# Patient Record
Sex: FEMALE | Race: BLACK OR AFRICAN AMERICAN | NOT HISPANIC OR LATINO | Employment: OTHER | URBAN - METROPOLITAN AREA
[De-identification: names, ages, dates, MRNs, and addresses within clinical notes are randomized per-mention and may not be internally consistent; named-entity substitution may affect disease eponyms.]

---

## 2022-03-04 ENCOUNTER — HOSPITAL ENCOUNTER (EMERGENCY)
Facility: HOSPITAL | Age: 67
Discharge: HOME | End: 2022-03-04
Attending: EMERGENCY MEDICINE
Payer: MEDICAID

## 2022-03-04 VITALS
HEART RATE: 62 BPM | DIASTOLIC BLOOD PRESSURE: 103 MMHG | WEIGHT: 159 LBS | BODY MASS INDEX: 28.17 KG/M2 | OXYGEN SATURATION: 98 % | TEMPERATURE: 99 F | HEIGHT: 63 IN | RESPIRATION RATE: 18 BRPM | SYSTOLIC BLOOD PRESSURE: 233 MMHG

## 2022-03-04 DIAGNOSIS — I10 HYPERTENSION: ICD-10-CM

## 2022-03-04 DIAGNOSIS — R21 RASH: ICD-10-CM

## 2022-03-04 DIAGNOSIS — I10 HYPERTENSION, UNSPECIFIED TYPE: ICD-10-CM

## 2022-03-04 DIAGNOSIS — R21 SKIN RASH: Primary | ICD-10-CM

## 2022-03-04 LAB
ANION GAP SERPL CALC-SCNC: 12 MMOL/L (ref 8–16)
BASOPHILS # BLD AUTO: 0.03 K/UL (ref 0–0.2)
BASOPHILS NFR BLD: 0.5 % (ref 0–1.9)
BUN SERPL-MCNC: 10 MG/DL (ref 8–23)
BUN SERPL-MCNC: 11 MG/DL (ref 6–30)
CALCIUM SERPL-MCNC: 9 MG/DL (ref 8.7–10.5)
CHLORIDE SERPL-SCNC: 101 MMOL/L (ref 95–110)
CHLORIDE SERPL-SCNC: 102 MMOL/L (ref 95–110)
CO2 SERPL-SCNC: 25 MMOL/L (ref 23–29)
CREAT SERPL-MCNC: 0.8 MG/DL (ref 0.5–1.4)
CREAT SERPL-MCNC: 0.8 MG/DL (ref 0.5–1.4)
DIFFERENTIAL METHOD: ABNORMAL
EOSINOPHIL # BLD AUTO: 0.1 K/UL (ref 0–0.5)
EOSINOPHIL NFR BLD: 2.2 % (ref 0–8)
ERYTHROCYTE [DISTWIDTH] IN BLOOD BY AUTOMATED COUNT: 13.9 % (ref 11.5–14.5)
EST. GFR  (AFRICAN AMERICAN): >60 ML/MIN/1.73 M^2
EST. GFR  (NON AFRICAN AMERICAN): >60 ML/MIN/1.73 M^2
GLUCOSE SERPL-MCNC: 270 MG/DL (ref 70–110)
GLUCOSE SERPL-MCNC: 278 MG/DL (ref 70–110)
HCT VFR BLD AUTO: 35.4 % (ref 37–48.5)
HCT VFR BLD CALC: 35 %PCV (ref 36–54)
HGB BLD-MCNC: 11.2 G/DL (ref 12–16)
IMM GRANULOCYTES # BLD AUTO: 0.01 K/UL (ref 0–0.04)
IMM GRANULOCYTES NFR BLD AUTO: 0.2 % (ref 0–0.5)
LYMPHOCYTES # BLD AUTO: 2.7 K/UL (ref 1–4.8)
LYMPHOCYTES NFR BLD: 42.8 % (ref 18–48)
MCH RBC QN AUTO: 27.5 PG (ref 27–31)
MCHC RBC AUTO-ENTMCNC: 31.6 G/DL (ref 32–36)
MCV RBC AUTO: 87 FL (ref 82–98)
MONOCYTES # BLD AUTO: 0.6 K/UL (ref 0.3–1)
MONOCYTES NFR BLD: 9.8 % (ref 4–15)
NEUTROPHILS # BLD AUTO: 2.8 K/UL (ref 1.8–7.7)
NEUTROPHILS NFR BLD: 44.5 % (ref 38–73)
NRBC BLD-RTO: 0 /100 WBC
PLATELET # BLD AUTO: 347 K/UL (ref 150–450)
PMV BLD AUTO: 11.1 FL (ref 9.2–12.9)
POC IONIZED CALCIUM: 1.21 MMOL/L (ref 1.06–1.42)
POC TCO2 (MEASURED): 28 MMOL/L (ref 23–29)
POTASSIUM BLD-SCNC: 3.1 MMOL/L (ref 3.5–5.1)
POTASSIUM SERPL-SCNC: 3.1 MMOL/L (ref 3.5–5.1)
RBC # BLD AUTO: 4.08 M/UL (ref 4–5.4)
SAMPLE: ABNORMAL
SODIUM BLD-SCNC: 140 MMOL/L (ref 136–145)
SODIUM SERPL-SCNC: 139 MMOL/L (ref 136–145)
WBC # BLD AUTO: 6.24 K/UL (ref 3.9–12.7)

## 2022-03-04 PROCEDURE — 99284 EMERGENCY DEPT VISIT MOD MDM: CPT | Mod: ,,, | Performed by: EMERGENCY MEDICINE

## 2022-03-04 PROCEDURE — 80047 BASIC METABLC PNL IONIZED CA: CPT

## 2022-03-04 PROCEDURE — 80048 BASIC METABOLIC PNL TOTAL CA: CPT | Performed by: EMERGENCY MEDICINE

## 2022-03-04 PROCEDURE — 25000003 PHARM REV CODE 250: Performed by: EMERGENCY MEDICINE

## 2022-03-04 PROCEDURE — 85025 COMPLETE CBC W/AUTO DIFF WBC: CPT | Performed by: EMERGENCY MEDICINE

## 2022-03-04 PROCEDURE — 63600175 PHARM REV CODE 636 W HCPCS: Performed by: EMERGENCY MEDICINE

## 2022-03-04 PROCEDURE — 99284 EMERGENCY DEPT VISIT MOD MDM: CPT | Mod: 25

## 2022-03-04 PROCEDURE — 99284 PR EMERGENCY DEPT VISIT,LEVEL IV: ICD-10-PCS | Mod: ,,, | Performed by: EMERGENCY MEDICINE

## 2022-03-04 PROCEDURE — 93010 ELECTROCARDIOGRAM REPORT: CPT | Mod: ,,, | Performed by: INTERNAL MEDICINE

## 2022-03-04 PROCEDURE — 93005 ELECTROCARDIOGRAM TRACING: CPT

## 2022-03-04 PROCEDURE — 86803 HEPATITIS C AB TEST: CPT | Performed by: EMERGENCY MEDICINE

## 2022-03-04 PROCEDURE — 93010 EKG 12-LEAD: ICD-10-PCS | Mod: ,,, | Performed by: INTERNAL MEDICINE

## 2022-03-04 RX ORDER — ATORVASTATIN CALCIUM 10 MG/1
10 TABLET, FILM COATED ORAL DAILY
Qty: 30 TABLET | Refills: 0 | Status: SHIPPED | OUTPATIENT
Start: 2022-03-04 | End: 2022-04-03

## 2022-03-04 RX ORDER — DIPHENHYDRAMINE HCL 25 MG
25 CAPSULE ORAL
Status: COMPLETED | OUTPATIENT
Start: 2022-03-04 | End: 2022-03-04

## 2022-03-04 RX ORDER — PREDNISONE 20 MG/1
40 TABLET ORAL
Status: COMPLETED | OUTPATIENT
Start: 2022-03-04 | End: 2022-03-04

## 2022-03-04 RX ORDER — HYDROCHLOROTHIAZIDE 25 MG/1
25 TABLET ORAL
Status: COMPLETED | OUTPATIENT
Start: 2022-03-04 | End: 2022-03-04

## 2022-03-04 RX ORDER — AMLODIPINE BESYLATE 10 MG/1
10 TABLET ORAL
Status: COMPLETED | OUTPATIENT
Start: 2022-03-04 | End: 2022-03-04

## 2022-03-04 RX ORDER — AMLODIPINE BESYLATE 10 MG/1
10 TABLET ORAL DAILY
Qty: 30 TABLET | Refills: 0 | Status: SHIPPED | OUTPATIENT
Start: 2022-03-04 | End: 2022-04-03

## 2022-03-04 RX ORDER — PREDNISONE 50 MG/1
50 TABLET ORAL DAILY
Qty: 5 TABLET | Refills: 0 | Status: SHIPPED | OUTPATIENT
Start: 2022-03-04 | End: 2022-03-09

## 2022-03-04 RX ORDER — GABAPENTIN 300 MG/1
300 CAPSULE ORAL 3 TIMES DAILY
Qty: 90 CAPSULE | Refills: 0 | Status: SHIPPED | OUTPATIENT
Start: 2022-03-04 | End: 2022-04-03

## 2022-03-04 RX ORDER — POTASSIUM CHLORIDE 20 MEQ/1
40 TABLET, EXTENDED RELEASE ORAL
Status: COMPLETED | OUTPATIENT
Start: 2022-03-04 | End: 2022-03-04

## 2022-03-04 RX ORDER — METFORMIN HYDROCHLORIDE 500 MG/1
500 TABLET ORAL 2 TIMES DAILY WITH MEALS
Qty: 60 TABLET | Refills: 0 | Status: SHIPPED | OUTPATIENT
Start: 2022-03-04 | End: 2022-04-03

## 2022-03-04 RX ORDER — HYDROCHLOROTHIAZIDE 25 MG/1
25 TABLET ORAL DAILY
Qty: 30 TABLET | Refills: 0 | Status: SHIPPED | OUTPATIENT
Start: 2022-03-04 | End: 2022-04-03

## 2022-03-04 RX ORDER — CETIRIZINE HYDROCHLORIDE 10 MG/1
10 TABLET ORAL DAILY
Qty: 30 TABLET | Refills: 0 | Status: SHIPPED | OUTPATIENT
Start: 2022-03-04 | End: 2022-04-03

## 2022-03-04 RX ADMIN — PREDNISONE 40 MG: 20 TABLET ORAL at 04:03

## 2022-03-04 RX ADMIN — DIPHENHYDRAMINE HYDROCHLORIDE 25 MG: 25 CAPSULE ORAL at 04:03

## 2022-03-04 RX ADMIN — HYDROCHLOROTHIAZIDE 25 MG: 25 TABLET ORAL at 04:03

## 2022-03-04 RX ADMIN — POTASSIUM CHLORIDE 40 MEQ: 1500 TABLET, EXTENDED RELEASE ORAL at 07:03

## 2022-03-04 RX ADMIN — AMLODIPINE BESYLATE 10 MG: 10 TABLET ORAL at 04:03

## 2022-03-04 NOTE — ED NOTES
I-STAT Chem-8+ Results:   Value Reference Range   Sodium 140 136-145 mmol/L   Potassium  3.1 3.5-5.1 mmol/L   Chloride 101  mmol/L   Ionized Calcium 1.21 1.06-1.42 mmol/L   CO2 (measured) 28 23-29 mmol/L   Glucose 278  mg/dL   BUN 11 6-30 mg/dL   Creatinine 0.8 0.5-1.4 mg/dL   Hematocrit 35 36-54%

## 2022-03-04 NOTE — ED TRIAGE NOTES
Patient reports to the ED today with reports of skin problem and HTN. Patient reports insect bites to face neck arms and breast. Patient found to be hypertensive in triage at 290/120 patient states not compliant with medications. Patient unsure of HTN medications.

## 2022-03-04 NOTE — ED PROVIDER NOTES
CC: Skin Problem (For week bumps under chin/jaw) and Hypertension (Not taking bp meds for some time)      History provided by:   Patient   HPI: Torie Erickson is a 66 y.o. year old female PMH of DM and HTN who presents to the ED for skin lesions.   Approximately 1 month ago she noticed 3 bumps on her skin on her upper anterior chest that initially went away however a week ago she started having similar problems on her upper extremities, face and chest.  The lesions are itchy, she describes old lesions go way and she has new lesions all the time.  She reports allergic reaction to shellfish however never had a skin rash like today.  Denies any new detergent or new creams, no new medications, no mucosal lesions: intraoral or in the genital area, no shortness of breath, she did not try any medications for the rash, she denies any pets at home    Patient reports that after Hurricaine Erica she did not see her primary care physician and she has not been taking any of her medications since October or Nov of last year.  She denies any headache, chest pain, shortness of breath, flank pain, abdominal pain, urinary changes, visual changes        Meds: amlodipine 5  hctz 25    Past Medical History:   Diagnosis Date    Diabetes mellitus     Hypertension      Past Surgical History:   Procedure Laterality Date    BREAST BIOPSY       No family history on file.  No current facility-administered medications on file prior to encounter.     Current Outpatient Medications on File Prior to Encounter   Medication Sig Dispense Refill    amlodipine (NORVASC) 5 MG tablet Take 2 tablets (10 mg total) by mouth once daily. 30 tablet 0    atorvastatin (LIPITOR) 10 MG tablet Take 10 mg by mouth once daily.      ergocalciferol (ERGOCALCIFEROL) 50,000 unit Cap Take 50,000 Units by mouth every 7 days.      gabapentin (NEURONTIN) 300 MG capsule Take 300 mg by mouth 3 (three) times daily.      hydrochlorothiazide (HYDRODIURIL) 25 MG tablet  Take 25 mg by mouth once daily.      metformin (GLUCOPHAGE) 500 MG tablet Take 1 tablet (500 mg total) by mouth 2 (two) times daily. (Patient taking differently: Take 1,000 mg by mouth 2 (two) times daily. ) 30 tablet 0    omeprazole (PRILOSEC) 20 MG capsule Take 1 capsule (20 mg total) by mouth once daily. 30 capsule 0    predniSONE (DELTASONE) 20 MG tablet Take 20 mg by mouth once daily.      tramadol (ULTRAM) 50 mg tablet Take 50 mg by mouth every 6 (six) hours as needed for Pain.       Shellfish containing products, Lisinopril, and Pcn [penicillins]  Social History     Socioeconomic History    Marital status: Single   Tobacco Use    Smoking status: Never Smoker    Smokeless tobacco: Never Used   Substance and Sexual Activity    Alcohol use: No    Drug use: No       ROS:     Constitutional : neg for fever, neg for weakness  HENT neg for head injury, neg for sore throat  Eyes: neg for visual changes, neg for eye pain  Resp neg for SOB, neg for cough  Cardiac  neg for chest pain, neg for palpitations, positive for hypertension  GI neg for abd pain, neg for nausea, neg for vomiting   neg for urinary changes  Neuro neg for focal weakness or numbness  Skin positive for skin rash  MSK: neg for myalgia, neg for arthralgia  ALL: Shellfish containing products, Lisinopril, and Pcn [penicillins]    PHYSICAL EXAM:  Vitals:    03/04/22 1537   BP: (!) 290/120   Pulse: 72   Resp: 18   Temp: 98.6 °F (37 °C)     VS: triage VS reviewed    general: comfortable, in no acute distress, pleasant, well nourished  HENT: neck symmetric, trachea midline  Eyes: PERRL,no conjunctival injection and symmetrical eyelids  CV: RRR, no  murmurs, no rubs, no gallops, no LE edema  Resp: comfortable breathing, speaks in full sentences, CTAB, no wheezing, no crackles, no ronchi  ABD:  soft, ND, no masses, + normal BS, NT  Renal: No CVAT  Neuro: AAO x 3, 5/5 strength x 4 extremities, sensation intact, face symmetric, speech normal  MSK:  NC/AT, extremities w/out deformity   Skin: warm, dry.  Erythematous macular lesions on the left side of her face on the jawline, a few lesions on her anterior chest, abdomen, lower legs and upper extremities.  No intraoral lesions.  No vesicles or pustules, neg Nikolsky            DATA & INTERVENTIONS:    LABS reviewed:  Labs Reviewed   HEPATITIS C ANTIBODY       RADIOLOGY reviewed:  Imaging Results    None         MEDICATIONS/FLUIDS:  Medications - No data to display      MDM:  Torie Erickson is a 66 y.o. year old female who presents to the ED for macular skin lesions ongoing for the last week with lesions healing and having new lesions all the time, pruritus, not painful, not target like, no vesicles or pustules no mucosal involvement.  No viral symptoms, no systemic symptoms  Possibly allergic reaction will start the patient on Benadryl and prednisone    Patient with history of hypertension noncompliant with her medications since October, asymptomatic at this point will obtain basic labs, EKG, patient is also diabetic she has not been taking metformin will check her blood glucose    DDX includes but not limited to: skin rash : allergic vs insect bites >>autoimmune, infectious etiology. Uncontrolled HTN asymptomatic    Labs ordered and reviewed:   istat blood gross to 78, potassium 3.1, normal bicarbonate and kidney function  CBC with normal white count and platelets, hemoglobin 11.2  BMP K 3.1, normal Ki fx  Medication given in the ED:  Hydrochlorothiazide, amlodipine    EKG (independantly reviewed):  Ventricular rate 70, sinus rhythm with PVCs, no acute ST T wave changes    Skin: rash will dc on zyrtec and prednisone burst x 5 days. To f/u with PCP if rash persistent, to return to the ED if worse  HTN: BP on arrival 290/120. Rpt BP after HCTZ and amlodipine: 230/103. Family at the bedside has the record of patient's BP when she was takign her BP medication and her BP was well controlled on her medications.  Discussed restarting amlodipine and HCTZ, to maintain journal of her daily BP, f/u with PCP for reassessment. Signs and symptoms that would require return to the ED discussed with the patient. Given prescriptions of her medications for 30 days    IMPRESSION:  1.) maculopapular rash  2.) HTN urgency, asymptomatic  3. hypoK    Dispo: Discharge    Critical Care Time: N/A       Mandy Washington MD  03/05/22 0956

## 2022-03-09 LAB — HCV AB SERPL QL IA: NEGATIVE

## 2023-06-27 ENCOUNTER — HOSPITAL ENCOUNTER (EMERGENCY)
Facility: OTHER | Age: 68
Discharge: HOME | End: 2023-06-27
Attending: EMERGENCY MEDICINE
Payer: MEDICARE

## 2023-06-27 VITALS
WEIGHT: 166 LBS | TEMPERATURE: 98 F | RESPIRATION RATE: 16 BRPM | BODY MASS INDEX: 30.55 KG/M2 | SYSTOLIC BLOOD PRESSURE: 146 MMHG | HEIGHT: 62 IN | DIASTOLIC BLOOD PRESSURE: 82 MMHG | OXYGEN SATURATION: 98 % | HEART RATE: 59 BPM

## 2023-06-27 DIAGNOSIS — E87.6 HYPOKALEMIA: ICD-10-CM

## 2023-06-27 DIAGNOSIS — I10 PRIMARY HYPERTENSION: Primary | ICD-10-CM

## 2023-06-27 DIAGNOSIS — Z75.8 DOES NOT HAVE PRIMARY CARE PROVIDER: ICD-10-CM

## 2023-06-27 DIAGNOSIS — M25.562 LEFT KNEE PAIN: ICD-10-CM

## 2023-06-27 LAB
ALBUMIN SERPL BCP-MCNC: 3.9 G/DL (ref 3.5–5.2)
ALP SERPL-CCNC: 75 U/L (ref 55–135)
ALT SERPL W/O P-5'-P-CCNC: 12 U/L (ref 10–44)
ANION GAP SERPL CALC-SCNC: 11 MMOL/L (ref 8–16)
AST SERPL-CCNC: 19 U/L (ref 10–40)
BASOPHILS # BLD AUTO: 0.03 K/UL (ref 0–0.2)
BASOPHILS NFR BLD: 0.6 % (ref 0–1.9)
BILIRUB SERPL-MCNC: 0.3 MG/DL (ref 0.1–1)
BUN SERPL-MCNC: 12 MG/DL (ref 8–23)
CALCIUM SERPL-MCNC: 9.5 MG/DL (ref 8.7–10.5)
CHLORIDE SERPL-SCNC: 106 MMOL/L (ref 95–110)
CO2 SERPL-SCNC: 27 MMOL/L (ref 23–29)
CREAT SERPL-MCNC: 0.9 MG/DL (ref 0.5–1.4)
DIFFERENTIAL METHOD: NORMAL
EOSINOPHIL # BLD AUTO: 0.1 K/UL (ref 0–0.5)
EOSINOPHIL NFR BLD: 1.1 % (ref 0–8)
ERYTHROCYTE [DISTWIDTH] IN BLOOD BY AUTOMATED COUNT: 12.8 % (ref 11.5–14.5)
EST. GFR  (NO RACE VARIABLE): >60 ML/MIN/1.73 M^2
GLUCOSE SERPL-MCNC: 135 MG/DL (ref 70–110)
HCT VFR BLD AUTO: 37 % (ref 37–48.5)
HCV AB SERPL QL IA: NEGATIVE
HGB BLD-MCNC: 12.3 G/DL (ref 12–16)
HIV 1+2 AB+HIV1 P24 AG SERPL QL IA: NEGATIVE
IMM GRANULOCYTES # BLD AUTO: 0 K/UL (ref 0–0.04)
IMM GRANULOCYTES NFR BLD AUTO: 0 % (ref 0–0.5)
LYMPHOCYTES # BLD AUTO: 2 K/UL (ref 1–4.8)
LYMPHOCYTES NFR BLD: 37.6 % (ref 18–48)
MCH RBC QN AUTO: 29.7 PG (ref 27–31)
MCHC RBC AUTO-ENTMCNC: 33.2 G/DL (ref 32–36)
MCV RBC AUTO: 89 FL (ref 82–98)
MONOCYTES # BLD AUTO: 0.4 K/UL (ref 0.3–1)
MONOCYTES NFR BLD: 7.3 % (ref 4–15)
NEUTROPHILS # BLD AUTO: 2.8 K/UL (ref 1.8–7.7)
NEUTROPHILS NFR BLD: 53.4 % (ref 38–73)
NRBC BLD-RTO: 0 /100 WBC
PLATELET # BLD AUTO: 348 K/UL (ref 150–450)
PMV BLD AUTO: 10.6 FL (ref 9.2–12.9)
POCT GLUCOSE: 117 MG/DL (ref 70–110)
POTASSIUM SERPL-SCNC: 3.3 MMOL/L (ref 3.5–5.1)
PROT SERPL-MCNC: 7.7 G/DL (ref 6–8.4)
RBC # BLD AUTO: 4.14 M/UL (ref 4–5.4)
SODIUM SERPL-SCNC: 144 MMOL/L (ref 136–145)
WBC # BLD AUTO: 5.32 K/UL (ref 3.9–12.7)

## 2023-06-27 PROCEDURE — 80053 COMPREHEN METABOLIC PANEL: CPT

## 2023-06-27 PROCEDURE — 85025 COMPLETE CBC W/AUTO DIFF WBC: CPT

## 2023-06-27 PROCEDURE — 93010 EKG 12-LEAD: ICD-10-PCS | Mod: ,,, | Performed by: INTERNAL MEDICINE

## 2023-06-27 PROCEDURE — 93010 ELECTROCARDIOGRAM REPORT: CPT | Mod: ,,, | Performed by: INTERNAL MEDICINE

## 2023-06-27 PROCEDURE — 25000003 PHARM REV CODE 250

## 2023-06-27 PROCEDURE — 86803 HEPATITIS C AB TEST: CPT | Performed by: NURSE PRACTITIONER

## 2023-06-27 PROCEDURE — 93005 ELECTROCARDIOGRAM TRACING: CPT

## 2023-06-27 PROCEDURE — 99285 EMERGENCY DEPT VISIT HI MDM: CPT

## 2023-06-27 PROCEDURE — 87389 HIV-1 AG W/HIV-1&-2 AB AG IA: CPT | Performed by: NURSE PRACTITIONER

## 2023-06-27 PROCEDURE — 82962 GLUCOSE BLOOD TEST: CPT

## 2023-06-27 RX ORDER — LOSARTAN POTASSIUM 50 MG/1
100 TABLET ORAL
Status: COMPLETED | OUTPATIENT
Start: 2023-06-27 | End: 2023-06-27

## 2023-06-27 RX ORDER — LOSARTAN POTASSIUM 100 MG/1
100 TABLET ORAL DAILY
Qty: 30 TABLET | Refills: 0 | Status: SHIPPED | OUTPATIENT
Start: 2023-06-27 | End: 2023-07-11 | Stop reason: SDUPTHER

## 2023-06-27 RX ORDER — POTASSIUM CHLORIDE 750 MG/1
10 TABLET, EXTENDED RELEASE ORAL
Status: COMPLETED | OUTPATIENT
Start: 2023-06-27 | End: 2023-06-27

## 2023-06-27 RX ORDER — AMLODIPINE BESYLATE 10 MG/1
10 TABLET ORAL DAILY
Qty: 30 TABLET | Refills: 0 | Status: SHIPPED | OUTPATIENT
Start: 2023-06-27 | End: 2023-07-11 | Stop reason: SDUPTHER

## 2023-06-27 RX ORDER — NAPROXEN 500 MG/1
500 TABLET ORAL 2 TIMES DAILY WITH MEALS
Qty: 10 TABLET | Refills: 0 | Status: SHIPPED | OUTPATIENT
Start: 2023-06-27 | End: 2023-07-02

## 2023-06-27 RX ORDER — HYDROCHLOROTHIAZIDE 25 MG/1
25 TABLET ORAL
Status: COMPLETED | OUTPATIENT
Start: 2023-06-27 | End: 2023-06-27

## 2023-06-27 RX ORDER — AMLODIPINE BESYLATE 5 MG/1
10 TABLET ORAL
Status: COMPLETED | OUTPATIENT
Start: 2023-06-27 | End: 2023-06-27

## 2023-06-27 RX ORDER — AMLODIPINE BESYLATE 5 MG/1
10 TABLET ORAL DAILY
Status: DISCONTINUED | OUTPATIENT
Start: 2023-06-28 | End: 2023-06-27

## 2023-06-27 RX ORDER — CLONIDINE HYDROCHLORIDE 0.1 MG/1
0.2 TABLET ORAL
Status: COMPLETED | OUTPATIENT
Start: 2023-06-27 | End: 2023-06-27

## 2023-06-27 RX ORDER — HYDROCHLOROTHIAZIDE 25 MG/1
25 TABLET ORAL DAILY
Qty: 30 TABLET | Refills: 0 | Status: SHIPPED | OUTPATIENT
Start: 2023-06-27 | End: 2023-07-11 | Stop reason: SDUPTHER

## 2023-06-27 RX ORDER — NAPROXEN 500 MG/1
500 TABLET ORAL
Status: COMPLETED | OUTPATIENT
Start: 2023-06-27 | End: 2023-06-27

## 2023-06-27 RX ADMIN — POTASSIUM CHLORIDE 10 MEQ: 750 TABLET, EXTENDED RELEASE ORAL at 04:06

## 2023-06-27 RX ADMIN — LOSARTAN POTASSIUM 100 MG: 50 TABLET, FILM COATED ORAL at 05:06

## 2023-06-27 RX ADMIN — CLONIDINE HYDROCHLORIDE 0.2 MG: 0.1 TABLET ORAL at 05:06

## 2023-06-27 RX ADMIN — NAPROXEN 500 MG: 500 TABLET ORAL at 04:06

## 2023-06-27 RX ADMIN — HYDROCHLOROTHIAZIDE 25 MG: 25 TABLET ORAL at 04:06

## 2023-06-27 RX ADMIN — AMLODIPINE BESYLATE 10 MG: 5 TABLET ORAL at 04:06

## 2023-06-27 NOTE — ED NOTES
Simone Fairbanks PA-C updated on pt's bp:212/101. No new orders received. Will continue to monitor.

## 2023-06-27 NOTE — DISCHARGE INSTRUCTIONS
Please apply ice to your knee as needed.  Please take naproxen as needed for pain.  Please start taking your blood pressure medications. Establish care with a primary care provider to better manage your blood pressure and diabetes.

## 2023-06-27 NOTE — ED PROVIDER NOTES
"Encounter Date: 6/27/2023       History     Chief Complaint   Patient presents with    Hypertension     Pt was seen across the street for wellness check and found to have hypertension 198/82. Pt states she has a history of hypertension but does not take antihypertensives. Endorsing blurry vision. Pt also had mechanical fall outside of the building when she missed a step. States she hit her head and L knee. Pt is ambulatory at triage.     68-year-old female with history of hypertension and diabetes mellitus presents to the emergency department for evaluation of elevated blood pressure today.  Patient states that she was at a wellness visit with [facility] today and her blood pressure was significantly elevated "198/82".  She was advised to present to the emergency department for further evaluation.  States that she has been noncompliant with blood pressure or diabetes medications for the last year.  States that she ran out of medications and never got them refilled.  She is currently in the process of finding a new PCP.  Per patient's daughter, on the way here, she tripped over a step, hit her left knee on a concrete step and left forehead on a glass window.  She denies loss of consciousness and has been ambulatory since the fall. She is not on blood thinners. She currently reports mild headache and left knee pain.  She denies dizziness, lightheadedness, paresthesias, vision changes, shortness of breath, palpitations, chest pain, abdominal pain, nausea, vomiting, diarrhea, or urinary symptoms.    The history is provided by the patient and a relative (daughter at bedside).   Review of patient's allergies indicates:   Allergen Reactions    Shellfish containing products Anaphylaxis    Lisinopril     Pcn [penicillins]      Past Medical History:   Diagnosis Date    Diabetes mellitus     Hypertension      Past Surgical History:   Procedure Laterality Date    BREAST BIOPSY       No family history on file.  Social " History     Tobacco Use    Smoking status: Never    Smokeless tobacco: Never   Substance Use Topics    Alcohol use: No    Drug use: No     Review of Systems   Constitutional:  Negative for chills and fever.   HENT:  Negative for congestion, rhinorrhea and sore throat.    Respiratory:  Negative for cough and shortness of breath.    Cardiovascular:  Negative for chest pain, palpitations and leg swelling.   Gastrointestinal:  Negative for abdominal pain, diarrhea, nausea and vomiting.   Genitourinary:  Negative for dysuria, frequency and urgency.   Musculoskeletal:  Negative for back pain.        Positive for left knee pain.   Skin:  Negative for rash.   Neurological:  Positive for headaches. Negative for dizziness, syncope, light-headedness and numbness.   Psychiatric/Behavioral:  Negative for confusion.      Physical Exam     Initial Vitals [06/27/23 1523]   BP Pulse Resp Temp SpO2   (!) 225/105 60 19 98.4 °F (36.9 °C) 98 %      MAP       --         Physical Exam    Nursing note and vitals reviewed.  Constitutional: She appears well-developed and well-nourished. No distress.   HENT:   Head: Normocephalic and atraumatic.   Right Ear: Tympanic membrane, external ear and ear canal normal. No hemotympanum.   Left Ear: Tympanic membrane, external ear and ear canal normal. No hemotympanum.   Mouth/Throat: Oropharynx is clear and moist.   Eyes: Conjunctivae and EOM are normal. Pupils are equal, round, and reactive to light.   Neck: Neck supple.   Normal range of motion.  Cardiovascular:  Normal rate, regular rhythm, normal heart sounds and intact distal pulses.           Pulmonary/Chest: Breath sounds normal. No respiratory distress. She has no wheezes. She has no rhonchi. She has no rales.   Abdominal: Abdomen is soft. Bowel sounds are normal. She exhibits no distension and no mass. There is no abdominal tenderness. There is no rebound and no guarding.   Musculoskeletal:         General: No edema. Normal range of motion.       Cervical back: Normal range of motion and neck supple.      Comments: Normal range of motion of bilateral upper and lower extremities.  Minimal diffuse tenderness to palpation of left anterior knee. No edema or ecchymoses.      Neurological: She is alert and oriented to person, place, and time. She has normal strength. No cranial nerve deficit or sensory deficit. GCS score is 15. GCS eye subscore is 4. GCS verbal subscore is 5. GCS motor subscore is 6.   No facial asymmetry. No aphasia. No nystagmus. Neurovascularly intact.  Sensation intact to light touch. No pronator drift.  Strength 5/5 in bilateral upper and lower extremities.  No focal neurological deficits.   Skin: Skin is warm and dry.   Few abrasions to the left anterior knee.   Psychiatric: She has a normal mood and affect. Her behavior is normal. Judgment and thought content normal.   Answers all questions appropriately.       ED Course   Procedures  Labs Reviewed   COMPREHENSIVE METABOLIC PANEL - Abnormal; Notable for the following components:       Result Value    Potassium 3.3 (*)     Glucose 135 (*)     All other components within normal limits   POCT GLUCOSE - Abnormal; Notable for the following components:    POCT Glucose 117 (*)     All other components within normal limits   HIV 1 / 2 ANTIBODY    Narrative:     Release to patient->Immediate   HEPATITIS C ANTIBODY    Narrative:     Release to patient->Immediate   CBC W/ AUTO DIFFERENTIAL     EKG Readings: (Independently Interpreted)   Initial Reading: No STEMI.   Normal sinus rhythm her rate of 61.  T-wave inversions in V4, V5, V6. Similar to previous EKG on 3/4/23.   ECG Results              EKG 12-lead (In process)  Result time 06/28/23 06:24:37      In process by Interface, Lab In [facility] (06/28/23 06:24:37)                   Narrative:    Test Reason : E87.6,    Vent. Rate : 061 BPM     Atrial Rate : 061 BPM     P-R Int : 140 ms          QRS Dur : 112 ms      QT Int : 454 ms       P-R-T  Axes : 045 057 266 degrees     QTc Int : 457 ms    Normal sinus rhythm  Incomplete left bundle branch block  T wave abnormality, consider inferolateral ischemia  Abnormal ECG      Referred By: [email or web address]   SELF           Confirmed By:                                   Imaging Results              X-Ray Knee 1 or 2 View Left (Final result)  Result time 06/27/23 16:35:41      Final result by Emely Gloria MD (06/27/23 16:35:41)                   Impression:      No acute osseous abnormality seen.      Electronically signed by: Emely Gloria  Date:    06/27/2023  Time:    16:35               Narrative:    EXAMINATION:  XR KNEE 1 OR 2 VIEW LEFT    CLINICAL HISTORY:  Pain in left knee    TECHNIQUE:  Two views left knee    COMPARISON:  None    FINDINGS:  No acute fracture or dislocation seen.  Degenerative change patellofemoral compartment.    No significant suprapatellar joint effusion or soft tissue edema.                                    X-Rays:   Independently Interpreted Readings:   Other Readings:  Left knee:  No fracture or dislocation.  Medications   hydroCHLOROthiazide tablet 25 mg (25 mg Oral Given 6/27/23 1601)   amLODIPine tablet 10 mg (10 mg Oral Given 6/27/23 1614)   potassium chloride SA CR tablet 10 mEq (10 mEq Oral Given 6/27/23 1637)   naproxen tablet 500 mg (500 mg Oral Given 6/27/23 1637)   losartan tablet 100 mg (100 mg Oral Given 6/27/23 1705)   cloNIDine tablet 0.2 mg (0.2 mg Oral Given 6/27/23 1705)     Medical Decision Making:   Initial Assessment:   Urgent evaluation of 68-year-old female who presents to the emergency department for evaluation of  significantly elevated blood pressure at wellness check today.  Also reports mechanical fall on the way here.  States that she hit her left knee on the ground in forehead on a glass door.  She denies loss of consciousness.  Has been ambulatory since the incident.  On exam, blood pressure is 200/105.  Will check basic labs and EKG.  Will start on  home blood pressure medications.  She does have a small hematoma to the left forehead.  Few small abrasions to the left anterior knee.  Normal range of motion of the knee.  Minimal diffuse tenderness to the left anterior knee.  No crepitus or deformity.  Will obtain x-ray of the left knee. Will give naproxen.  Clinical Tests:   Lab Tests: Ordered and Reviewed  Radiological Study: Ordered and Reviewed  ED Management:  On review of labs, no leukocytosis.  H&H stable.  Potassium 3.3 today.  Will replenish.  Glucose 135.  Her blood pressure continues to remain elevated after receiving amlodipine and hydrochlorothiazide.  She continues to remain asymptomatic.  Case discussed with supervising physician.  Will give p.o. losartan 100 mg and Catapres 0.2 mg and recheck blood pressure in 1 hour.  On review of x-ray, there is no fracture or dislocation of the left knee.    On reassessment, patient continues to feel okay.  Her blood pressure has trended down to 160/74.  Will discharge home with prescriptions for amlodipine, hydrochlorothiazide, and losartan.  Strongly advised the patient to start taking blood pressure medications daily.  Will also provide ambulatory referral to Internal Medicine.  Will also discharge home with naproxen to take as needed for pain.  She and her daughter verbalized understanding and agreement with this plan of care.  She was given specific return precautions.  All questions and concerns addressed.  She is stable for discharge.                        Clinical Impression:   Final diagnoses:  [M25.562] Left knee pain  [E87.6] Hypokalemia  [I10] Primary hypertension (Primary)  [Z75.8] Does not have primary care provider        ED Disposition Condition    Discharge Stable          ED Prescriptions       Medication Sig Dispense Start Date End Date Auth. Provider    amLODIPine (NORVASC) 10 MG tablet Take 1 tablet (10 mg total) by mouth once daily. 30 tablet 6/27/2023 7/27/2023 Simone Fairbanks PA-C     hydroCHLOROthiazide (HYDRODIURIL) 25 MG tablet Take 1 tablet (25 mg total) by mouth once daily. 30 tablet 6/27/2023 7/27/2023 Simone Fairbanks PA-C    losartan (COZAAR) 100 MG tablet Take 1 tablet (100 mg total) by mouth once daily. 30 tablet 6/27/2023 7/27/2023 Simone Fairbanks PA-C    naproxen (NAPROSYN) 500 MG tablet Take 1 tablet (500 mg total) by mouth 2 (two) times daily with meals. for 5 days 10 tablet 6/27/2023 7/2/2023 Simone Fairbanks PA-C          Follow-up Information    None          Simone Fairbanks PA-C  06/28/23 0055

## 2023-07-11 ENCOUNTER — OFFICE VISIT (OUTPATIENT)
Dept: INTERNAL MEDICINE | Facility: CLINIC | Age: 68
End: 2023-07-11
Payer: MEDICARE

## 2023-07-11 VITALS
WEIGHT: 166.88 LBS | BODY MASS INDEX: 29.57 KG/M2 | OXYGEN SATURATION: 100 % | HEIGHT: 63 IN | DIASTOLIC BLOOD PRESSURE: 70 MMHG | SYSTOLIC BLOOD PRESSURE: 132 MMHG | HEART RATE: 68 BPM

## 2023-07-11 DIAGNOSIS — I10 PRIMARY HYPERTENSION: ICD-10-CM

## 2023-07-11 PROCEDURE — 1160F PR REVIEW ALL MEDS BY PRESCRIBER/CLIN PHARMACIST DOCUMENTED: ICD-10-PCS | Mod: CPTII,GC,S$GLB,

## 2023-07-11 PROCEDURE — 99999 PR PBB SHADOW E&M-EST. PATIENT-LVL V: CPT | Mod: PBBFAC,GC,,

## 2023-07-11 PROCEDURE — 3288F FALL RISK ASSESSMENT DOCD: CPT | Mod: CPTII,GC,S$GLB,

## 2023-07-11 PROCEDURE — 1126F AMNT PAIN NOTED NONE PRSNT: CPT | Mod: CPTII,GC,S$GLB,

## 2023-07-11 PROCEDURE — 1100F PR PT FALLS ASSESS DOC 2+ FALLS/FALL W/INJURY/YR: ICD-10-PCS | Mod: CPTII,GC,S$GLB,

## 2023-07-11 PROCEDURE — 3288F PR FALLS RISK ASSESSMENT DOCUMENTED: ICD-10-PCS | Mod: CPTII,GC,S$GLB,

## 2023-07-11 PROCEDURE — 99203 OFFICE O/P NEW LOW 30 MIN: CPT | Mod: GC,S$GLB,,

## 2023-07-11 PROCEDURE — 3008F BODY MASS INDEX DOCD: CPT | Mod: CPTII,GC,S$GLB,

## 2023-07-11 PROCEDURE — 4010F ACE/ARB THERAPY RXD/TAKEN: CPT | Mod: CPTII,GC,S$GLB,

## 2023-07-11 PROCEDURE — 3008F PR BODY MASS INDEX (BMI) DOCUMENTED: ICD-10-PCS | Mod: CPTII,GC,S$GLB,

## 2023-07-11 PROCEDURE — 1160F RVW MEDS BY RX/DR IN RCRD: CPT | Mod: CPTII,GC,S$GLB,

## 2023-07-11 PROCEDURE — 1100F PTFALLS ASSESS-DOCD GE2>/YR: CPT | Mod: CPTII,GC,S$GLB,

## 2023-07-11 PROCEDURE — 1159F PR MEDICATION LIST DOCUMENTED IN MEDICAL RECORD: ICD-10-PCS | Mod: CPTII,GC,S$GLB,

## 2023-07-11 PROCEDURE — 1126F PR PAIN SEVERITY QUANTIFIED, NO PAIN PRESENT: ICD-10-PCS | Mod: CPTII,GC,S$GLB,

## 2023-07-11 PROCEDURE — 99203 PR OFFICE/OUTPT VISIT, NEW, LEVL III, 30-44 MIN: ICD-10-PCS | Mod: GC,S$GLB,,

## 2023-07-11 PROCEDURE — 3078F PR MOST RECENT DIASTOLIC BLOOD PRESSURE < 80 MM HG: ICD-10-PCS | Mod: CPTII,GC,S$GLB,

## 2023-07-11 PROCEDURE — 1159F MED LIST DOCD IN RCRD: CPT | Mod: CPTII,GC,S$GLB,

## 2023-07-11 PROCEDURE — 3075F SYST BP GE 130 - 139MM HG: CPT | Mod: CPTII,GC,S$GLB,

## 2023-07-11 PROCEDURE — 99999 PR PBB SHADOW E&M-EST. PATIENT-LVL V: ICD-10-PCS | Mod: PBBFAC,GC,,

## 2023-07-11 PROCEDURE — 4010F PR ACE/ARB THEARPY RXD/TAKEN: ICD-10-PCS | Mod: CPTII,GC,S$GLB,

## 2023-07-11 PROCEDURE — 3075F PR MOST RECENT SYSTOLIC BLOOD PRESS GE 130-139MM HG: ICD-10-PCS | Mod: CPTII,GC,S$GLB,

## 2023-07-11 PROCEDURE — 3078F DIAST BP <80 MM HG: CPT | Mod: CPTII,GC,S$GLB,

## 2023-07-11 RX ORDER — HYDROCHLOROTHIAZIDE 25 MG/1
25 TABLET ORAL DAILY
Qty: 30 TABLET | Refills: 11 | Status: SHIPPED | OUTPATIENT
Start: 2023-07-11 | End: 2024-07-10

## 2023-07-11 RX ORDER — LOSARTAN POTASSIUM 100 MG/1
100 TABLET ORAL DAILY
Qty: 30 TABLET | Refills: 11 | Status: SHIPPED | OUTPATIENT
Start: 2023-07-11 | End: 2024-07-10

## 2023-07-11 RX ORDER — AMLODIPINE BESYLATE 10 MG/1
10 TABLET ORAL DAILY
Qty: 30 TABLET | Refills: 11 | Status: SHIPPED | OUTPATIENT
Start: 2023-07-11 | End: 2024-07-10

## 2023-07-11 NOTE — PROGRESS NOTES
Internal Medicine Clinic Note  2023    Subjective   Patient Name: KARINA Erickson  : 1955    Chief Complaint:   Chief Complaint   Patient presents with    Establish Care       History of Present Illness:  Ms. KARINA Erickson is a 68 y.o. female presenting to clinic as a referral for management of hypertension. She reports that on 2023, she was outside of [employer]'s building when her foot got caught on the curb and caused her to fall and injure herself. From there, she and her daughter decided to go to the Emergency Department to get checked out. While in the ED, she was found to have a blood pressure of 225/105. She reports that while she had been on blood pressure medications in the past, she had been off of them for many years prior to that ED visit.     Since the ED visit, she was prescribed Amlodipine 10, Losartan 100, and HCTZ 25 which she takes on a nightly basis. She has been using her daughter's digital blood pressure monitoring to keep track of her blood pressure as well. Most recent check was documented on 2023 as 1219/60. She reports having forgotten to take her medications last night, but took them this morning prior to her appointment.     Near to the end of our visit, she became very tearful when I asked if there was anything else bothering her or anything else that I could do for her. But she shook her head and declined wanting to further address what was on her mind. Her daughter offered to step out of the room, but patient wants to address these issues at a future appointment. Once I reentered the room to discuss AVS, patient felt inclined to tell me that she has hit her head a few times over many years on a floating cabinet while mopping, but she did not wish to go into further detail at this time. Her daughter also mentions that the patient is due for routine health screenings, but both patient and daughter want to defer these issues for a future  appointment.    Review of Systems   Constitutional:  Negative for chills, diaphoresis, fever, malaise/fatigue and weight loss.   HENT:  Negative for congestion, ear pain, sinus pain and sore throat.    Eyes:  Negative for photophobia, pain and redness.   Respiratory:  Negative for cough, sputum production, shortness of breath and wheezing.    Cardiovascular:  Negative for chest pain, palpitations, claudication and leg swelling.   Gastrointestinal:  Negative for abdominal pain, constipation, diarrhea, heartburn, nausea and vomiting.   Genitourinary:  Negative for dysuria, frequency and hematuria.   Musculoskeletal:  Positive for falls. Negative for back pain and myalgias.   Skin:  Negative for itching and rash.   Neurological:  Negative for dizziness, tingling, weakness and headaches.   Endo/Heme/Allergies:  Does not bruise/bleed easily.   Psychiatric/Behavioral:  Negative for depression. The patient is not nervous/anxious and does not have insomnia.      PAST HISTORY:     Past Medical History:   Diagnosis Date    Diabetes mellitus     Hypertension        Past Surgical History:   Procedure Laterality Date    BREAST BIOPSY         No family history on file.      MEDICATIONS & ALLERGIES:       Current Outpatient Medications:     amLODIPine (NORVASC) 10 MG tablet, Take 1 tablet (10 mg total) by mouth once daily., Disp: 30 tablet, Rfl: 11    atorvastatin (LIPITOR) 10 MG tablet, Take 1 tablet (10 mg total) by mouth once daily., Disp: 30 tablet, Rfl: 0    cetirizine (ZYRTEC) 10 MG tablet, Take 1 tablet (10 mg total) by mouth once daily., Disp: 30 tablet, Rfl: 0    ergocalciferol (ERGOCALCIFEROL) 50,000 unit Cap, Take 50,000 Units by mouth every 7 days., Disp: , Rfl:     gabapentin (NEURONTIN) 300 MG capsule, Take 1 capsule (300 mg total) by mouth 3 (three) times daily., Disp: 90 capsule, Rfl: 0    hydroCHLOROthiazide (HYDRODIURIL) 25 MG tablet, Take 1 tablet (25 mg total) by mouth once daily., Disp: 30 tablet, Rfl: 11     "losartan (COZAAR) 100 MG tablet, Take 1 tablet (100 mg total) by mouth once daily., Disp: 30 tablet, Rfl: 11    metFORMIN (GLUCOPHAGE) 500 MG tablet, Take 1 tablet (500 mg total) by mouth 2 (two) times daily with meals., Disp: 60 tablet, Rfl: 0    omeprazole (PRILOSEC) 20 MG capsule, Take 1 capsule (20 mg total) by mouth once daily., Disp: 30 capsule, Rfl: 0    tramadol (ULTRAM) 50 mg tablet, Take 50 mg by mouth every 6 (six) hours as needed for Pain., Disp: , Rfl:     Review of patient's allergies indicates:   Allergen Reactions    Shellfish containing products Anaphylaxis     Anaphylaxis^  Anaphylaxis^      Doxycycline      Vomiting^  Vomiting^      Erythromycin      Tremor (shaking)^  Tremor (shaking)^      Iodine      Anaphylaxis^  Anaphylaxis^      Lisinopril     Tetracycline      Vomiting^Body feels like it's swimming, Tremor (shaking)^  Vomiting^Body feels like it's swimming, Tremor (shaking)^      Penicillins      Other reaction(s): Other (See Comments)       OBJECTIVE:     Vital Signs:  Vitals:    07/11/23 1105 07/11/23 1130   BP: (!) 148/72 132/70   BP Location: Right arm Right arm   Patient Position: Sitting    BP Method: Medium (Automatic)    Pulse: 68    SpO2: 100%    Weight: 75.7 kg (166 lb 14.2 oz)    Height: 5' 2.5" (1.588 m)        Body mass index is 30.04 kg/m².     Physical Exam  Vitals and nursing note reviewed.   Constitutional:       General: She is awake. She is not in acute distress.     Appearance: Normal appearance. She is normal weight. She is not ill-appearing, toxic-appearing or diaphoretic.   HENT:      Head: Normocephalic and atraumatic.      Nose: Nose normal. No congestion or rhinorrhea.      Mouth/Throat:      Mouth: Mucous membranes are moist.      Pharynx: No oropharyngeal exudate or posterior oropharyngeal erythema.   Eyes:      General: No scleral icterus.        Right eye: No discharge.         Left eye: No discharge.      Extraocular Movements: Extraocular movements intact.    "   Pupils: Pupils are equal, round, and reactive to light.   Cardiovascular:      Rate and Rhythm: Normal rate and regular rhythm.      Pulses: Normal pulses.      Heart sounds: No murmur heard.    No friction rub.   Pulmonary:      Effort: Pulmonary effort is normal. No respiratory distress.      Breath sounds: No wheezing, rhonchi or rales.   Chest:      Chest wall: No tenderness.   Abdominal:      General: Bowel sounds are normal. There is no distension.      Palpations: Abdomen is soft.      Tenderness: There is no abdominal tenderness. There is no guarding or rebound.   Musculoskeletal:         General: No swelling or tenderness.      Cervical back: Normal range of motion. No rigidity.      Right lower leg: No edema.      Left lower leg: No edema.   Lymphadenopathy:      Cervical: No cervical adenopathy.   Skin:     General: Skin is warm and dry.      Findings: No erythema or rash.   Neurological:      General: No focal deficit present.      Mental Status: She is alert and oriented to person, place, and time.      Sensory: No sensory deficit.      Motor: No weakness.   Psychiatric:         Mood and Affect: Mood normal. Affect is tearful.         Behavior: Behavior normal. Behavior is cooperative.         Thought Content: Thought content normal.      Comments: Became tearful when asked if she had any questions or if there was anything else that I could do for her today     Laboratory  Recent Results (from the past 336 hour(s))   HIV 1/2 Ag/Ab (4th Gen)    Collection Time: 06/27/23  3:58 PM   Result Value Ref Range    HIV 1/2 Ag/Ab Negative Negative   Hepatitis C Antibody    Collection Time: 06/27/23  3:58 PM   Result Value Ref Range    Hepatitis C Ab Negative Negative   CBC auto differential    Collection Time: 06/27/23  3:58 PM   Result Value Ref Range    WBC 5.32 3.90 - 12.70 K/uL    RBC 4.14 4.00 - 5.40 M/uL    Hemoglobin 12.3 12.0 - 16.0 g/dL    Hematocrit 37.0 37.0 - 48.5 %    MCV 89 82 - 98 fL    MCH 29.7  27.0 - 31.0 pg    MCHC 33.2 32.0 - 36.0 g/dL    RDW 12.8 11.5 - 14.5 %    Platelets 348 150 - 450 K/uL    MPV 10.6 9.2 - 12.9 fL    Immature Granulocytes 0.0 0.0 - 0.5 %    Gran # (ANC) 2.8 1.8 - 7.7 K/uL    Immature Grans (Abs) 0.00 0.00 - 0.04 K/uL    Lymph # 2.0 1.0 - 4.8 K/uL    Mono # 0.4 0.3 - 1.0 K/uL    Eos # 0.1 0.0 - 0.5 K/uL    Baso # 0.03 0.00 - 0.20 K/uL    nRBC 0 0 /100 WBC    Gran % 53.4 38.0 - 73.0 %    Lymph % 37.6 18.0 - 48.0 %    Mono % 7.3 4.0 - 15.0 %    Eosinophil % 1.1 0.0 - 8.0 %    Basophil % 0.6 0.0 - 1.9 %    Differential Method Automated    Comprehensive metabolic panel    Collection Time: 06/27/23  3:58 PM   Result Value Ref Range    Sodium 144 136 - 145 mmol/L    Potassium 3.3 (L) 3.5 - 5.1 mmol/L    Chloride 106 95 - 110 mmol/L    CO2 27 23 - 29 mmol/L    Glucose 135 (H) 70 - 110 mg/dL    BUN 12 8 - 23 mg/dL    Creatinine 0.9 0.5 - 1.4 mg/dL    Calcium 9.5 8.7 - 10.5 mg/dL    Total Protein 7.7 6.0 - 8.4 g/dL    Albumin 3.9 3.5 - 5.2 g/dL    Total Bilirubin 0.3 0.1 - 1.0 mg/dL    Alkaline Phosphatase 75 55 - 135 U/L    AST 19 10 - 40 U/L    ALT 12 10 - 44 U/L    eGFR >60 >60 mL/min/1.73 m^2    Anion Gap 11 8 - 16 mmol/L   POCT glucose    Collection Time: 06/27/23  4:05 PM   Result Value Ref Range    POCT Glucose 117 (H) 70 - 110 mg/dL        Health Maintenance Due   Topic Date Due    Lipid Panel  Never done    TETANUS VACCINE  Never done    Hemoglobin A1c (Diabetic Prevention Screening)  Never done    DEXA Scan  Never done    Colorectal Cancer Screening  Never done    Shingles Vaccine (1 of 2) Never done    Pneumococcal Vaccines (Age 65+) (1 - PCV) 04/17/2020    Mammogram  12/26/2020    COVID-19 Vaccine (2 - Pfizer series) 10/04/2021       ASSESSMENT & PLAN:       Primary hypertension  Patient recently evaluated in the ED on 6/27/23 and found to have blood pressure 225/105. Had previously been on blood pressure medications but had been off of them for several years before the ED visit.  Referred for follow up on HTN. Since ED visit, she has been taking amlodipine, HCTZ, and losartan as prescribed. Her daughter, who is very involved in her care, has been letting her use her digital monitoring cuff to check her blood pressure. Most recent BP was on 7/4/23 as 129/60. Takes her medications nightly.   - Educated on obtaining BP cuff and keeping a log of the readings to bring to next appointment  - amLODIPine (NORVASC) 10 MG tablet; Take 1 tablet (10 mg total) by mouth once daily.  Dispense: 30 tablet; Refill: 11  - hydroCHLOROthiazide (HYDRODIURIL) 25 MG tablet; Take 1 tablet (25 mg total) by mouth once daily.  Dispense: 30 tablet; Refill: 11  - losartan (COZAAR) 100 MG tablet; Take 1 tablet (100 mg total) by mouth once daily.  Dispense: 30 tablet; Refill: 11      Preventative Health: Will plan to address at next appointment    RTC in 1 month    Discussed with Dr. Reji Coello, DO  Internal Medicine PGY-2  [facility]

## 2024-05-09 DIAGNOSIS — Z12.31 VISIT FOR SCREENING MAMMOGRAM: Primary | ICD-10-CM

## 2024-05-09 DIAGNOSIS — Z78.0 POSTMENOPAUSAL STATUS, AGE-RELATED: Primary | ICD-10-CM

## 2024-05-23 ENCOUNTER — HOSPITAL ENCOUNTER (OUTPATIENT)
Dept: RADIOLOGY | Facility: HOSPITAL | Age: 69
Discharge: HOME | End: 2024-05-23
Payer: MEDICARE

## 2024-05-23 DIAGNOSIS — Z12.31 VISIT FOR SCREENING MAMMOGRAM: ICD-10-CM

## 2024-05-23 PROCEDURE — 77067 SCR MAMMO BI INCL CAD: CPT | Mod: 26,,, | Performed by: RADIOLOGY

## 2024-05-23 PROCEDURE — 77063 BREAST TOMOSYNTHESIS BI: CPT | Mod: TC

## 2024-05-23 PROCEDURE — 77063 BREAST TOMOSYNTHESIS BI: CPT | Mod: 26,,, | Performed by: RADIOLOGY

## 2024-06-24 ENCOUNTER — HOSPITAL ENCOUNTER (OUTPATIENT)
Dept: RADIOLOGY | Facility: CLINIC | Age: 69
Discharge: HOME | End: 2024-06-24
Payer: MEDICARE

## 2024-06-24 DIAGNOSIS — Z78.0 POSTMENOPAUSAL STATUS, AGE-RELATED: ICD-10-CM

## 2024-06-24 PROCEDURE — 77080 DXA BONE DENSITY AXIAL: CPT | Mod: TC

## 2024-06-24 PROCEDURE — 77080 DXA BONE DENSITY AXIAL: CPT | Mod: 26,,, | Performed by: INTERNAL MEDICINE
